# Patient Record
Sex: MALE | Race: ASIAN | NOT HISPANIC OR LATINO | Employment: FULL TIME | URBAN - METROPOLITAN AREA
[De-identification: names, ages, dates, MRNs, and addresses within clinical notes are randomized per-mention and may not be internally consistent; named-entity substitution may affect disease eponyms.]

---

## 2017-08-23 ENCOUNTER — PATIENT OUTREACH (OUTPATIENT)
Dept: ADMINISTRATIVE | Facility: HOSPITAL | Age: 37
End: 2017-08-23

## 2019-02-06 ENCOUNTER — HOSPITAL ENCOUNTER (EMERGENCY)
Facility: HOSPITAL | Age: 39
Discharge: HOME | End: 2019-02-06
Attending: EMERGENCY MEDICINE
Payer: MEDICAID

## 2019-02-06 VITALS
SYSTOLIC BLOOD PRESSURE: 133 MMHG | BODY MASS INDEX: 24.11 KG/M2 | HEIGHT: 66 IN | TEMPERATURE: 98 F | OXYGEN SATURATION: 95 % | HEART RATE: 74 BPM | WEIGHT: 150 LBS | DIASTOLIC BLOOD PRESSURE: 76 MMHG | RESPIRATION RATE: 18 BRPM

## 2019-02-06 DIAGNOSIS — H20.9 TRAUMATIC IRITIS: Primary | ICD-10-CM

## 2019-02-06 PROCEDURE — 99282 EMERGENCY DEPT VISIT SF MDM: CPT

## 2019-02-06 NOTE — ED PROVIDER NOTES
"SCRIBE #1 NOTE: I, Felicita Vázquez, am scribing for, and in the presence of, Mattie Gonzalez MD. I have scribed the entire note.       History     Chief Complaint   Patient presents with    Eye Injury     "food flew into my eye yesterday and I tried eye drops but today it's red and it hurts to open"     Review of patient's allergies indicates:  No Known Allergies      History of Present Illness     HPI    2/6/2019, 7:26 AM  History obtained from the patient      History of Present Illness: Ron Vázquez is a 38 y.o. male patient who presents to the Emergency Department for evaluation of R eye pain which onset 1 night ago. Pt suspects pepper and powder got into his eye while he was cooking last night. Symptoms are constant and moderate in severity. No mitigating or exacerbating factors reported. Associated sxs include R eye redness. Patient denies any double vision, blurry vision, acute vision changes, eye drainage, fever, chills, n/v/d, abd pain, CP, SOB, vision changes, and all other sxs at this time. Prior Tx includes eye drops for dry eyes. No further complaints or concerns at this time.       Arrival mode: Personal vehicle      PCP: Cj Jarrett MD        Past Medical History:  History reviewed. No pertinent medical history.    Past Surgical History:  Past Surgical History:   Procedure Laterality Date    TONSILLECTOMY           Family History:  Family History   Problem Relation Age of Onset    Kidney disease Mother        Social History:  Social History     Tobacco Use    Smoking status: Never Smoker   Substance and Sexual Activity    Alcohol use: Yes    Drug use: No    Sexual activity: Unknown         Review of Systems     Review of Systems   Constitutional: Negative for chills, diaphoresis and fever.   HENT: Negative for congestion, rhinorrhea and sore throat.    Eyes: Positive for pain (R) and redness (R).        (-) vision changes   Respiratory: Negative for cough and shortness of breath.  " "  Cardiovascular: Negative for chest pain.   Gastrointestinal: Negative for abdominal pain, diarrhea, nausea and vomiting.   Genitourinary: Negative for dysuria and frequency.   Musculoskeletal: Negative for back pain and neck pain.   Skin: Negative for rash.   Neurological: Negative for dizziness, weakness and headaches.   Hematological: Does not bruise/bleed easily.   All other systems reviewed and are negative.       Physical Exam     Initial Vitals [02/06/19 0713]   BP Pulse Resp Temp SpO2   133/76 74 18 97.9 °F (36.6 °C) 95 %      MAP       --          Physical Exam  Nursing Notes and Vital Signs Reviewed.  Constitutional: Patient is in no acute distress. Well-developed and well-nourished.  Head: Atraumatic. Normocephalic.  Eyes: PERRL. EOM intact. Scleral and conjunctival injection. No drainage.  ENT: Mucous membranes are moist. Oropharynx is clear and symmetric.    Neck: Supple. Full ROM. No lymphadenopathy.  Cardiovascular: Regular rate. Regular rhythm. No murmurs, rubs, or gallops. Distal pulses are 2+ and symmetric.  Pulmonary/Chest: No respiratory distress. Clear to auscultation bilaterally. No wheezing or rales.  Abdominal: Soft and non-distended.  There is no tenderness.  No rebound, guarding, or rigidity.   Musculoskeletal: Moves all extremities. No obvious deformities. No edema.  Skin: Warm and dry.  Neurological:  Alert, awake, and appropriate.  Normal speech.  No acute focal neurological deficits are appreciated.  Psychiatric: Normal affect. Good eye contact. Appropriate in content.     ED Course   Procedures  ED Vital Signs:  Vitals:    02/06/19 0713   BP: 133/76   Pulse: 74   Resp: 18   Temp: 97.9 °F (36.6 °C)   TempSrc: Oral   SpO2: 95%   Weight: 68 kg (150 lb)   Height: 5' 6" (1.676 m)                The Emergency Provider reviewed the vital signs and test results, which are outlined above.     ED Discussion     7:26 AM: Assessed pt at this time. Patient is awake, alert, and in NAD. Discussed " with pt all pertinent ED information and results. Discussed pt dx and plan of tx. D/w pt that 600-800 mg of ibuprofen can be taken for pain relief. Gave pt all f/u and return to the ED instructions. Advised pt to f/u with an Optometrist or Ophthalmologist today.  All questions and concerns were addressed at this time. Pt expresses understanding of information and instructions, and is comfortable with plan to discharge. Pt is stable for discharge.    I discussed with patient and/or family/caretaker that evaluation in the ED does not suggest any emergent or life threatening medical conditions requiring immediate intervention beyond what was provided in the ED, and I believe patient is safe for discharge.  Regardless, an unremarkable evaluation in the ED does not preclude the development or presence of a serious of life threatening condition. As such, patient was instructed to return immediately for any worsening or change in current symptoms.    ED Medication(s):  Medications - No data to display    New Prescriptions    No medications on file       Follow-up Information     [facility] - Optometry. Schedule an appointment as soon as possible for a visit today.    Specialty:  Optometry  Why:  return to the emergency room, If symptoms worsen  Contact information:  [id number] [facility] [id number] [phone]  Additional information:  3rd Floor                                   Scribe Attestation:   Scribe #1: I performed the above scribed service and the documentation accurately describes the services I performed. I attest to the accuracy of the note.     Attending:   Physician Attestation Statement for Scribe #1: I, Mattie Gonzalez MD, personally performed the services described in this documentation, as scribed by Felicita Vázquez, in my presence, and it is both accurate and complete.           Clinical Impression       ICD-10-CM ICD-9-CM   1. Traumatic iritis H20.9 364.3       Disposition:    Disposition: Discharged  Condition: Stable         Mattie Gonzalez MD  02/08/19 0837